# Patient Record
Sex: MALE | Race: BLACK OR AFRICAN AMERICAN
[De-identification: names, ages, dates, MRNs, and addresses within clinical notes are randomized per-mention and may not be internally consistent; named-entity substitution may affect disease eponyms.]

---

## 2018-02-12 ENCOUNTER — HOSPITAL ENCOUNTER (EMERGENCY)
Dept: HOSPITAL 72 - EMR | Age: 83
Discharge: HOME | End: 2018-02-12
Payer: MEDICARE

## 2018-02-12 VITALS — WEIGHT: 185 LBS | BODY MASS INDEX: 28.04 KG/M2 | HEIGHT: 68 IN

## 2018-02-12 VITALS — DIASTOLIC BLOOD PRESSURE: 87 MMHG | SYSTOLIC BLOOD PRESSURE: 127 MMHG

## 2018-02-12 VITALS — SYSTOLIC BLOOD PRESSURE: 137 MMHG | DIASTOLIC BLOOD PRESSURE: 67 MMHG

## 2018-02-12 DIAGNOSIS — R42: ICD-10-CM

## 2018-02-12 DIAGNOSIS — E11.9: ICD-10-CM

## 2018-02-12 DIAGNOSIS — R55: Primary | ICD-10-CM

## 2018-02-12 LAB
ADD MANUAL DIFF: NO
ALBUMIN SERPL-MCNC: 3.6 G/DL (ref 3.4–5)
ALBUMIN/GLOB SERPL: 1 {RATIO} (ref 1–2.7)
ALP SERPL-CCNC: 80 U/L (ref 46–116)
ALT SERPL-CCNC: 30 U/L (ref 12–78)
ANION GAP SERPL CALC-SCNC: 8 MMOL/L (ref 5–15)
AST SERPL-CCNC: 22 U/L (ref 15–37)
BASOPHILS NFR BLD AUTO: 1.5 % (ref 0–2)
BILIRUB SERPL-MCNC: 0.3 MG/DL (ref 0.2–1)
BUN SERPL-MCNC: 16 MG/DL (ref 7–18)
CALCIUM SERPL-MCNC: 10.5 MG/DL (ref 8.5–10.1)
CHLORIDE SERPL-SCNC: 104 MMOL/L (ref 98–107)
CO2 SERPL-SCNC: 31 MMOL/L (ref 21–32)
CREAT SERPL-MCNC: 1.3 MG/DL (ref 0.55–1.3)
EOSINOPHIL NFR BLD AUTO: 1.1 % (ref 0–3)
ERYTHROCYTE [DISTWIDTH] IN BLOOD BY AUTOMATED COUNT: 12 % (ref 11.6–14.8)
GLOBULIN SER-MCNC: 3.7 G/DL
HCT VFR BLD CALC: 41.7 % (ref 42–52)
HGB BLD-MCNC: 14.1 G/DL (ref 14.2–18)
LYMPHOCYTES NFR BLD AUTO: 30 % (ref 20–45)
MCV RBC AUTO: 94 FL (ref 80–99)
MONOCYTES NFR BLD AUTO: 8.6 % (ref 1–10)
NEUTROPHILS NFR BLD AUTO: 58.8 % (ref 45–75)
PLATELET # BLD: 164 K/UL (ref 150–450)
POTASSIUM SERPL-SCNC: 3.8 MMOL/L (ref 3.5–5.1)
RBC # BLD AUTO: 4.45 M/UL (ref 4.7–6.1)
SODIUM SERPL-SCNC: 142 MMOL/L (ref 136–145)
WBC # BLD AUTO: 5.1 K/UL (ref 4.8–10.8)

## 2018-02-12 PROCEDURE — 36415 COLL VENOUS BLD VENIPUNCTURE: CPT

## 2018-02-12 PROCEDURE — 84484 ASSAY OF TROPONIN QUANT: CPT

## 2018-02-12 PROCEDURE — 93005 ELECTROCARDIOGRAM TRACING: CPT

## 2018-02-12 PROCEDURE — 71045 X-RAY EXAM CHEST 1 VIEW: CPT

## 2018-02-12 PROCEDURE — 99283 EMERGENCY DEPT VISIT LOW MDM: CPT

## 2018-02-12 PROCEDURE — 85025 COMPLETE CBC W/AUTO DIFF WBC: CPT

## 2018-02-12 PROCEDURE — 82962 GLUCOSE BLOOD TEST: CPT

## 2018-02-12 PROCEDURE — 83880 ASSAY OF NATRIURETIC PEPTIDE: CPT

## 2018-02-12 PROCEDURE — 80053 COMPREHEN METABOLIC PANEL: CPT

## 2018-02-12 NOTE — EMERGENCY ROOM REPORT
History of Present Illness


General


Chief Complaint:  Syncope


Source:  Patient





Present Illness


HPI


82-year-old male with pmhx of diabetes p/w pre-syncopal episode.


Patient states that he was at work, was at a meeting, felt very lightheaded, 

felt like he was about to faint but did not pass out


No head trauma no LOC


Pt states feeling lightheadedness/nauseous. 


Denies sob, palpitations, or chest pain before event. 


There was no seizure like activity, tongue biting, urinary incontinence, blurry 

vision, or HA. Pt has been eating/drinking well.


Denies recent fever, chills, n/v/d.





Patient History


Past Medical History:  see triage record


Past Surgical History:  none


Pertinent Family History:  none


Reviewed Nursing Documentation:  PMH: Agreed, PSxH: Agreed





Nursing Documentation-PMH


Hx Diabetes:  Yes





Review of Systems


All Other Systems:  negative except mentioned in HPI





Physical Exam





Vital Signs








  Date Time  Temp Pulse Resp B/P (MAP) Pulse Ox O2 Delivery O2 Flow Rate FiO2


 


2/12/18 13:03 98.2 80 16 110/76 98 Room Air  





 98.2       








Sp02 EP Interpretation:  reviewed, normal


General Appearance:  normal inspection, well appearing, no apparent distress, 

alert, GCS 15, non-toxic


Head:  normocephalic, atraumatic


Eyes:  bilateral eye normal inspection, bilateral eye PERRL, bilateral eye EOMI


ENT:  normal ENT inspection, normal pharynx, normal voice, moist mucus membranes


Neck:  normal inspection, full range of motion, supple


Respiratory:  normal inspection, lungs clear, normal breath sounds, no 

respiratory distress, no retraction, no wheezing, speaking full sentences, 

chest symmetrical


Cardiovascular #1:  normal inspection, regular rate, rhythm, no edema, normal 

capillary refill


Cardiovascular #2:  2+ radial (R), 2+ radial (L)


Gastrointestinal:  normal inspection, non tender, soft, non-distended, no 

guarding


Genitourinary:  no CVA tenderness


Musculoskeletal:  normal inspection, back normal, normal range of motion, non-

tender


Neurologic:  normal inspection, alert, oriented x3, responsive, CNs III-XII nml 

as tested, motor strength/tone normal, sensory intact, normal gait, speech 

normal


Psychiatric:  normal inspection, judgement/insight normal, memory normal


Skin:  normal inspection, normal color, no rash, warm/dry, well hydrated, 

normal turgor





Medical Decision Making


Diagnostic Impression:  


 Primary Impression:  


 Pre-syncope


 Additional Impression:  


 Lightheadedness


ER Course


82-year-old male with syncopal episode





DDX:


Vasovagal vs. orthostatic / hypovolemic/dehydration vs. cardiac arrhythmia (SVT

, Afib) vs. cardiac (AS, ACS) vs. PE vs. metabolic (hypoglycemia, hypoxia), vs 

neuro (seizure, CVA, intracranial bleed)





Plan:


bgm, cbc, bmp, ekg, cxr





ER course:


Patient has remained stable during ED stay.


Patient feels much better, has been aox4 during stay


Labs unremarkable


states he has girlfriend at home to stay with him


ambulating without issue





Disposition:


Patient will be DCed to home


FU with PMD in 1-2 days





Please note that this Emergency Department Report was dictated using [employer] technology software, occasionally this can lead to 

erroneous entry secondary to interpretation by the dictation equipment








EKG Diagnostic Results


EP Interpretation: Yes


Rate: normal


Rhythm: NSR


ST Segments: No acute changes, +PVCs


ASA given to patient: No





Rhythm Strip


EP Interpretation: Yes


Rate:80


Rhythm: NSR, no PVCs, no ectopy





Chest X-ray 


CXR: Ordered: Yes


1 view


Indication: Syncope


EP interpretation: Yes


Interpretation: No consolidation, no effusion, no PTX, no acute cardiopulmonary 

disease


Impression: No acute disease





Electronically signed by Ngozi Nair MD





Laboratory Tests








Test


  2/12/18


13:50


 


White Blood Count


  5.1 K/UL


(4.8-10.8)


 


Red Blood Count


  4.45 M/UL


(4.70-6.10)  L


 


Hemoglobin


  14.1 G/DL


(14.2-18.0)  L


 


Hematocrit


  41.7 %


(42.0-52.0)  L


 


Mean Corpuscular Volume 94 FL (80-99)  


 


Mean Corpuscular Hemoglobin


  31.6 PG


(27.0-31.0)  H


 


Mean Corpuscular Hemoglobin


Concent 33.7 G/DL


(32.0-36.0)


 


Red Cell Distribution Width


  12.0 %


(11.6-14.8)


 


Platelet Count


  164 K/UL


(150-450)


 


Mean Platelet Volume


  10.6 FL


(6.5-10.1)  H


 


Neutrophils (%) (Auto)


  58.8 %


(45.0-75.0)


 


Lymphocytes (%) (Auto)


  30.0 %


(20.0-45.0)


 


Monocytes (%) (Auto)


  8.6 %


(1.0-10.0)


 


Eosinophils (%) (Auto)


  1.1 %


(0.0-3.0)


 


Basophils (%) (Auto)


  1.5 %


(0.0-2.0)


 


Sodium Level


  142 MMOL/L


(136-145)


 


Potassium Level


  3.8 MMOL/L


(3.5-5.1)


 


Chloride Level


  104 MMOL/L


()


 


Carbon Dioxide Level


  31 MMOL/L


(21-32)


 


Anion Gap


  8 mmol/L


(5-15)


 


Blood Urea Nitrogen


  16 mg/dL


(7-18)


 


Creatinine


  1.3 MG/DL


(0.55-1.30)


 


Estimate Glomerular


Filtration Rate  mL/min (>60)  


 


 


Glucose Level


  100 MG/DL


()


 


Calcium Level


  10.5 MG/DL


(8.5-10.1)  H


 


Total Bilirubin Pending  


 


Aspartate Amino Transferase


(AST) Pending  


 


 


Alanine Aminotransferase (ALT) Pending  


 


Alkaline Phosphatase Pending  


 


Troponin I


  0.009 ng/mL


(0.000-0.056)


 


Pro-B-Type Natriuretic Peptide Pending  


 


Total Protein Pending  


 


Albumin Pending  


 


Globulin Pending  











Last Vital Signs








  Date Time  Temp Pulse Resp B/P (MAP) Pulse Ox O2 Delivery O2 Flow Rate FiO2


 


2/12/18 13:03 98.2 80 16 110/76 98 Room Air  





 98.2       








Disposition:  HOME, SELF-CARE


Condition:  Improved


Patient Instructions:  Dizziness, Easy-to-Read











Ngozi Nair M.D. Feb 12, 2018 14:06

## 2018-02-13 NOTE — CARDIOLOGY REPORT
--------------- APPROVED REPORT --------------





EKG Measurement

Heart [id number]

[id number]

[id number]

[id number]

[id number]





sinus with pvc